# Patient Record
Sex: FEMALE | Race: BLACK OR AFRICAN AMERICAN
[De-identification: names, ages, dates, MRNs, and addresses within clinical notes are randomized per-mention and may not be internally consistent; named-entity substitution may affect disease eponyms.]

---

## 2022-05-07 ENCOUNTER — HOSPITAL ENCOUNTER (INPATIENT)
Dept: HOSPITAL 54 - ER | Age: 72
LOS: 3 days | Discharge: NURSING, REHAB OR LONG-TERM CARE | DRG: 641 | End: 2022-05-10
Attending: FAMILY MEDICINE | Admitting: INTERNAL MEDICINE
Payer: MEDICARE

## 2022-05-07 VITALS — WEIGHT: 160 LBS | BODY MASS INDEX: 24.25 KG/M2 | HEIGHT: 68 IN

## 2022-05-07 VITALS — SYSTOLIC BLOOD PRESSURE: 148 MMHG | DIASTOLIC BLOOD PRESSURE: 86 MMHG

## 2022-05-07 VITALS — SYSTOLIC BLOOD PRESSURE: 158 MMHG | DIASTOLIC BLOOD PRESSURE: 69 MMHG

## 2022-05-07 DIAGNOSIS — J44.9: ICD-10-CM

## 2022-05-07 DIAGNOSIS — R62.7: Primary | ICD-10-CM

## 2022-05-07 DIAGNOSIS — E11.65: ICD-10-CM

## 2022-05-07 DIAGNOSIS — I10: ICD-10-CM

## 2022-05-07 DIAGNOSIS — F03.90: ICD-10-CM

## 2022-05-07 DIAGNOSIS — B96.89: ICD-10-CM

## 2022-05-07 DIAGNOSIS — Z91.013: ICD-10-CM

## 2022-05-07 DIAGNOSIS — N39.0: ICD-10-CM

## 2022-05-07 DIAGNOSIS — E88.09: ICD-10-CM

## 2022-05-07 DIAGNOSIS — E78.5: ICD-10-CM

## 2022-05-07 DIAGNOSIS — K21.9: ICD-10-CM

## 2022-05-07 DIAGNOSIS — I69.351: ICD-10-CM

## 2022-05-07 DIAGNOSIS — D63.8: ICD-10-CM

## 2022-05-07 DIAGNOSIS — E44.0: ICD-10-CM

## 2022-05-07 DIAGNOSIS — Z20.822: ICD-10-CM

## 2022-05-07 LAB
ALBUMIN SERPL BCP-MCNC: 3.2 G/DL (ref 3.4–5)
ALP SERPL-CCNC: 84 U/L (ref 46–116)
ALT SERPL W P-5'-P-CCNC: 27 U/L (ref 12–78)
AST SERPL W P-5'-P-CCNC: 20 U/L (ref 15–37)
BASOPHILS # BLD AUTO: 0 K/UL (ref 0–0.2)
BASOPHILS NFR BLD AUTO: 0.4 % (ref 0–2)
BILIRUB DIRECT SERPL-MCNC: 0.1 MG/DL (ref 0–0.2)
BILIRUB SERPL-MCNC: 0.6 MG/DL (ref 0.2–1)
BILIRUB UR QL STRIP: NEGATIVE
BUN SERPL-MCNC: 17 MG/DL (ref 7–18)
CALCIUM SERPL-MCNC: 9.3 MG/DL (ref 8.5–10.1)
CHLORIDE SERPL-SCNC: 102 MMOL/L (ref 98–107)
CO2 SERPL-SCNC: 25 MMOL/L (ref 21–32)
COLOR UR: YELLOW
CREAT SERPL-MCNC: 0.8 MG/DL (ref 0.6–1.3)
DEPRECATED SQUAMOUS URNS QL MICRO: (no result) /HPF
EOSINOPHIL NFR BLD AUTO: 3 % (ref 0–6)
GLUCOSE SERPL-MCNC: 215 MG/DL (ref 74–106)
GLUCOSE UR STRIP-MCNC: NEGATIVE MG/DL
HCT VFR BLD AUTO: 36 % (ref 33–45)
HGB BLD-MCNC: 12.3 G/DL (ref 11.5–14.8)
LEUKOCYTE ESTERASE UR QL STRIP: (no result)
LIPASE SERPL-CCNC: 52 U/L (ref 73–393)
LYMPHOCYTES NFR BLD AUTO: 2.3 K/UL (ref 0.8–4.8)
LYMPHOCYTES NFR BLD AUTO: 22.3 % (ref 20–44)
MCHC RBC AUTO-ENTMCNC: 34 G/DL (ref 31–36)
MCV RBC AUTO: 86 FL (ref 82–100)
MONOCYTES NFR BLD AUTO: 1.1 K/UL (ref 0.1–1.3)
MONOCYTES NFR BLD AUTO: 10.6 % (ref 2–12)
NEUTROPHILS # BLD AUTO: 6.6 K/UL (ref 1.8–8.9)
NEUTROPHILS NFR BLD AUTO: 63.7 % (ref 43–81)
NITRITE UR QL STRIP: NEGATIVE
PH UR STRIP: 6 [PH] (ref 5–8)
PLATELET # BLD AUTO: 367 K/UL (ref 150–450)
POTASSIUM SERPL-SCNC: 3.8 MMOL/L (ref 3.5–5.1)
PROT SERPL-MCNC: 7.9 G/DL (ref 6.4–8.2)
PROT UR QL STRIP: 100 MG/DL
RBC # BLD AUTO: 4.24 MIL/UL (ref 4–5.2)
RBC #/AREA URNS HPF: (no result) /HPF (ref 0–2)
SODIUM SERPL-SCNC: 136 MMOL/L (ref 136–145)
UROBILINOGEN UR STRIP-MCNC: 0.2 EU/DL
WBC NRBC COR # BLD AUTO: 10.3 K/UL (ref 4.3–11)

## 2022-05-07 PROCEDURE — C9803 HOPD COVID-19 SPEC COLLECT: HCPCS

## 2022-05-07 PROCEDURE — G0378 HOSPITAL OBSERVATION PER HR: HCPCS

## 2022-05-07 RX ADMIN — Medication SCH EACH: at 17:52

## 2022-05-07 RX ADMIN — CLONIDINE HYDROCHLORIDE SCH MG: 0.1 TABLET ORAL at 22:36

## 2022-05-07 RX ADMIN — ATORVASTATIN CALCIUM SCH MG: 10 TABLET, FILM COATED ORAL at 22:36

## 2022-05-07 RX ADMIN — ENOXAPARIN SODIUM SCH MG: 40 INJECTION SUBCUTANEOUS at 17:54

## 2022-05-07 RX ADMIN — INSULIN GLARGINE SCH UNIT: 100 INJECTION, SOLUTION SUBCUTANEOUS at 22:00

## 2022-05-07 RX ADMIN — Medication SCH MG: at 22:36

## 2022-05-07 RX ADMIN — INSULIN GLARGINE SCH UNIT: 100 INJECTION, SOLUTION SUBCUTANEOUS at 22:21

## 2022-05-07 RX ADMIN — Medication SCH EACH: at 22:29

## 2022-05-07 RX ADMIN — HUMAN INSULIN PRN UNIT: 100 INJECTION, SOLUTION SUBCUTANEOUS at 22:14

## 2022-05-07 RX ADMIN — INSULIN HUMAN PRN UNITS: 100 INJECTION, SOLUTION PARENTERAL at 18:00

## 2022-05-07 NOTE — NUR
RECEIVED PATIENT IN BED, ALERT AND ORIENTED X2-3, ROOM AIR, EXPRESSIVE APHASIA, FOLLOWS 
COMMAND, RIGHT SIDED HEMIPARESIS, NOT IN APPARENT PAIN, CONTE CATHETER DRAINING WELL, HEELS 
OFFLOADED, WILL CONTINUE TO MONITOR.

## 2022-05-07 NOTE — NUR
END OF SHIFT REPORT



Received patient via [facility] at 1545. Patient is alert but with expressive aphasia noted, 
able to communicate needs by pointing and through action. Stable on room air, breathing 
evenly and unlabored. No SOB or s/s of distress noted. IV access on LAC #20, 1/2 NS hooked 
and running at 65ml/hr. West catheter in place draining to a yellow colored urine. Patient 
oriented to room and how to use the call light. Patient made comfortable. Photos of multiple 
wound taken and placed in chart. Lung clear. Bowel sounds present x 4. Safety precautions in 
place: bed in low, locked position; siderails up x 2; call light within reach. will endorse 
to night shift nurse for JAY.

## 2022-05-08 VITALS — DIASTOLIC BLOOD PRESSURE: 78 MMHG | SYSTOLIC BLOOD PRESSURE: 138 MMHG

## 2022-05-08 VITALS — DIASTOLIC BLOOD PRESSURE: 61 MMHG | SYSTOLIC BLOOD PRESSURE: 148 MMHG

## 2022-05-08 VITALS — DIASTOLIC BLOOD PRESSURE: 70 MMHG | SYSTOLIC BLOOD PRESSURE: 139 MMHG

## 2022-05-08 VITALS — DIASTOLIC BLOOD PRESSURE: 78 MMHG | SYSTOLIC BLOOD PRESSURE: 167 MMHG

## 2022-05-08 LAB
ALBUMIN SERPL BCP-MCNC: 2.9 G/DL (ref 3.4–5)
ALP SERPL-CCNC: 71 U/L (ref 46–116)
ALT SERPL W P-5'-P-CCNC: 24 U/L (ref 12–78)
AST SERPL W P-5'-P-CCNC: 20 U/L (ref 15–37)
BASOPHILS # BLD AUTO: 0 K/UL (ref 0–0.2)
BASOPHILS NFR BLD AUTO: 0.5 % (ref 0–2)
BILIRUB SERPL-MCNC: 0.6 MG/DL (ref 0.2–1)
BUN SERPL-MCNC: 13 MG/DL (ref 7–18)
CALCIUM SERPL-MCNC: 9 MG/DL (ref 8.5–10.1)
CHLORIDE SERPL-SCNC: 105 MMOL/L (ref 98–107)
CO2 SERPL-SCNC: 22 MMOL/L (ref 21–32)
CREAT SERPL-MCNC: 0.8 MG/DL (ref 0.6–1.3)
EOSINOPHIL NFR BLD AUTO: 3.3 % (ref 0–6)
GLUCOSE SERPL-MCNC: 142 MG/DL (ref 74–106)
HCT VFR BLD AUTO: 33 % (ref 33–45)
HGB BLD-MCNC: 11.3 G/DL (ref 11.5–14.8)
LYMPHOCYTES NFR BLD AUTO: 2.1 K/UL (ref 0.8–4.8)
LYMPHOCYTES NFR BLD AUTO: 23.5 % (ref 20–44)
MAGNESIUM SERPL-MCNC: 1.9 MG/DL (ref 1.8–2.4)
MCHC RBC AUTO-ENTMCNC: 34 G/DL (ref 31–36)
MCV RBC AUTO: 86 FL (ref 82–100)
MONOCYTES NFR BLD AUTO: 1.3 K/UL (ref 0.1–1.3)
MONOCYTES NFR BLD AUTO: 14.1 % (ref 2–12)
NEUTROPHILS # BLD AUTO: 5.3 K/UL (ref 1.8–8.9)
NEUTROPHILS NFR BLD AUTO: 58.6 % (ref 43–81)
PHOSPHATE SERPL-MCNC: 4 MG/DL (ref 2.5–4.9)
PLATELET # BLD AUTO: 367 K/UL (ref 150–450)
POTASSIUM SERPL-SCNC: 3.9 MMOL/L (ref 3.5–5.1)
PROT SERPL-MCNC: 7.3 G/DL (ref 6.4–8.2)
RBC # BLD AUTO: 3.85 MIL/UL (ref 4–5.2)
SODIUM SERPL-SCNC: 138 MMOL/L (ref 136–145)
WBC NRBC COR # BLD AUTO: 9.1 K/UL (ref 4.3–11)

## 2022-05-08 RX ADMIN — HUMAN INSULIN PRN UNIT: 100 INJECTION, SOLUTION SUBCUTANEOUS at 21:37

## 2022-05-08 RX ADMIN — INSULIN HUMAN PRN UNITS: 100 INJECTION, SOLUTION PARENTERAL at 12:54

## 2022-05-08 RX ADMIN — CLONIDINE HYDROCHLORIDE SCH MG: 0.1 TABLET ORAL at 21:11

## 2022-05-08 RX ADMIN — Medication SCH EACH: at 12:47

## 2022-05-08 RX ADMIN — Medication SCH EACH: at 06:47

## 2022-05-08 RX ADMIN — CLONIDINE HYDROCHLORIDE SCH MG: 0.1 TABLET ORAL at 12:48

## 2022-05-08 RX ADMIN — Medication SCH MG: at 09:34

## 2022-05-08 RX ADMIN — DEXTROSE MONOHYDRATE SCH MLS/HR: 50 INJECTION, SOLUTION INTRAVENOUS at 09:35

## 2022-05-08 RX ADMIN — POLYETHYLENE GLYCOL 3350 SCH GM: 17 POWDER, FOR SOLUTION ORAL at 09:34

## 2022-05-08 RX ADMIN — ATORVASTATIN CALCIUM SCH MG: 10 TABLET, FILM COATED ORAL at 21:12

## 2022-05-08 RX ADMIN — Medication SCH MG: at 21:12

## 2022-05-08 RX ADMIN — INSULIN HUMAN PRN UNITS: 100 INJECTION, SOLUTION PARENTERAL at 06:32

## 2022-05-08 RX ADMIN — Medication SCH EACH: at 21:29

## 2022-05-08 RX ADMIN — FERROUS SULFATE TAB 325 MG (65 MG ELEMENTAL FE) SCH MG: 325 (65 FE) TAB at 09:34

## 2022-05-08 RX ADMIN — INSULIN GLARGINE SCH UNIT: 100 INJECTION, SOLUTION SUBCUTANEOUS at 21:37

## 2022-05-08 RX ADMIN — CLONIDINE HYDROCHLORIDE SCH MG: 0.1 TABLET ORAL at 05:17

## 2022-05-08 RX ADMIN — INSULIN HUMAN PRN UNITS: 100 INJECTION, SOLUTION PARENTERAL at 17:50

## 2022-05-08 RX ADMIN — PANTOPRAZOLE SODIUM SCH MG: 40 TABLET, DELAYED RELEASE ORAL at 09:34

## 2022-05-08 RX ADMIN — Medication SCH EACH: at 17:41

## 2022-05-08 RX ADMIN — AMLODIPINE BESYLATE SCH MG: 10 TABLET ORAL at 09:34

## 2022-05-08 RX ADMIN — THERA TABS SCH UDTAB: TAB at 09:34

## 2022-05-08 RX ADMIN — ENOXAPARIN SODIUM SCH MG: 40 INJECTION SUBCUTANEOUS at 17:38

## 2022-05-08 RX ADMIN — INSULIN GLARGINE SCH UNIT: 100 INJECTION, SOLUTION SUBCUTANEOUS at 21:32

## 2022-05-08 NOTE — NUR
ALERT/ORIENTED X2-3, APHASIAC, ABLE TO FOLLOW COMMANDS, STABLE ON ROOM AIR, NO COMPLAIN OF 
PAIN, IRRITABLE AT TIMES, ABLE TO SWALLOW MEDICATIONS WHOLE, GIVEN ONE AT A TIME, RIGHT 
SIDED WEAKNESS, SOLVING CROSSWORD, CONTE CATHETER DRAINING WELL, CONTINUE IVF, BLOOD SUGAR 
CONTROL, BP CONTROL, ON CLONIDINE AND HYDRALAZINE, URINE CULTURE PENDING. FALL PRECAUTION,

## 2022-05-08 NOTE — NUR
MS RN CLOSING NOTE



Patient in bed, awake. Patient is alert but with expressive aphasia, able to make needs 
known by pointing and actions. Stable on room air, breathing evenly and unlabored. No SOB or 
s/s of distress noted. IV access on LAC #20 SL, intact and patent. West catheter in place 
draining to a yellow colored urine with an output of 750 cc. Due meds given. All needs 
attended to. Safety precautions maintained: bed in low, locked position; siderails up x 2; 
call light within reach. Will endorse to night shift nurse for JAY.

## 2022-05-08 NOTE — NUR
MS RN OPENING NOTE



Patient in bed, asleep. Patient is alert but with expressive aphasia. On room air, breathing 
evenly and unlabored. No SOB or s/s of distress noted. IV access on LAC #20 running 1/2 NS 
at 65 ml/hr. West catheter in place draining to a yellow colored urine. Safety precautions 
in place: bed in low, locked position; siderails up x 2; call light within reach. Will 
continue to monitor.

## 2022-05-09 VITALS — SYSTOLIC BLOOD PRESSURE: 133 MMHG | DIASTOLIC BLOOD PRESSURE: 62 MMHG

## 2022-05-09 VITALS — SYSTOLIC BLOOD PRESSURE: 139 MMHG | DIASTOLIC BLOOD PRESSURE: 62 MMHG

## 2022-05-09 VITALS — DIASTOLIC BLOOD PRESSURE: 59 MMHG | SYSTOLIC BLOOD PRESSURE: 144 MMHG

## 2022-05-09 RX ADMIN — INSULIN HUMAN PRN UNITS: 100 INJECTION, SOLUTION PARENTERAL at 12:27

## 2022-05-09 RX ADMIN — FERROUS SULFATE TAB 325 MG (65 MG ELEMENTAL FE) SCH MG: 325 (65 FE) TAB at 08:25

## 2022-05-09 RX ADMIN — Medication SCH EACH: at 18:08

## 2022-05-09 RX ADMIN — Medication SCH MG: at 21:16

## 2022-05-09 RX ADMIN — INSULIN HUMAN PRN UNITS: 100 INJECTION, SOLUTION PARENTERAL at 06:56

## 2022-05-09 RX ADMIN — Medication SCH EACH: at 11:51

## 2022-05-09 RX ADMIN — Medication SCH EACH: at 22:16

## 2022-05-09 RX ADMIN — CLONIDINE HYDROCHLORIDE SCH MG: 0.1 TABLET ORAL at 21:18

## 2022-05-09 RX ADMIN — ENOXAPARIN SODIUM SCH MG: 40 INJECTION SUBCUTANEOUS at 18:07

## 2022-05-09 RX ADMIN — AMLODIPINE BESYLATE SCH MG: 10 TABLET ORAL at 08:26

## 2022-05-09 RX ADMIN — CLONIDINE HYDROCHLORIDE SCH MG: 0.1 TABLET ORAL at 05:47

## 2022-05-09 RX ADMIN — PANTOPRAZOLE SODIUM SCH MG: 40 TABLET, DELAYED RELEASE ORAL at 08:25

## 2022-05-09 RX ADMIN — Medication SCH MG: at 08:25

## 2022-05-09 RX ADMIN — INSULIN HUMAN PRN UNITS: 100 INJECTION, SOLUTION PARENTERAL at 18:10

## 2022-05-09 RX ADMIN — CLONIDINE HYDROCHLORIDE SCH MG: 0.1 TABLET ORAL at 12:25

## 2022-05-09 RX ADMIN — DEXTROSE MONOHYDRATE SCH MLS/HR: 50 INJECTION, SOLUTION INTRAVENOUS at 08:25

## 2022-05-09 RX ADMIN — INSULIN GLARGINE SCH UNIT: 100 INJECTION, SOLUTION SUBCUTANEOUS at 22:10

## 2022-05-09 RX ADMIN — INSULIN HUMAN PRN UNITS: 100 INJECTION, SOLUTION PARENTERAL at 22:08

## 2022-05-09 RX ADMIN — ATORVASTATIN CALCIUM SCH MG: 10 TABLET, FILM COATED ORAL at 21:16

## 2022-05-09 RX ADMIN — Medication SCH EACH: at 06:50

## 2022-05-09 RX ADMIN — POLYETHYLENE GLYCOL 3350 SCH GM: 17 POWDER, FOR SOLUTION ORAL at 08:25

## 2022-05-09 RX ADMIN — INSULIN GLARGINE SCH UNIT: 100 INJECTION, SOLUTION SUBCUTANEOUS at 22:00

## 2022-05-09 RX ADMIN — THERA TABS SCH UDTAB: TAB at 08:25

## 2022-05-09 NOTE — NUR
MS RN OPENING NOTE



Patient in received in bed awake aox2. On room air, breathing evenly and unlabored. No sign 
sob/distress noted. IV access on LAC #20 running 1/2 NS at 65 ml/hr. West catheter in place 
draining to a yellow colored urine no odor noted.Safety precautions in place: bed in low, 
locked position; siderails up x 2; call light within reach. Will continue to monitor.

## 2022-05-09 NOTE — NUR
MS RN CLOSING NOTE 



PATIENT AWAKE IN BED, PATIENT ALERT/ORIENTED X 2 WITH EXPRESSIVE APHASIA. PATIENT STABLE ON 
RA, NO S/S OF DISTRESS OR SOB NOTED, BREATHING EVEN AND UNLABORED. IV ACCESS ON LEFT AC #20G 
INTACT AND SALINE LOCKED. CONTE CATH IN PLACE AND DRAINING YELLOW URINE. NO SIGNIFICANT 
CHANGES THROUGHOUT SHIFT. MEDICATIONS GIVEN AS ORDERED, PT NEEDS MET THROUGHOUT SHIFT. 
SAFETY MEASURES IN PLACE: CALL LIGHT WITHIN REACH, SIDE RAILS UP X 2, BED LOCKED IN LOWEST 
POSITION, HOB ELEVATED, BED ALARM ON. WILL ENDORSE TO DAY SHIFT NURSE FOR CONTINUITY OF CARE

## 2022-05-10 VITALS — DIASTOLIC BLOOD PRESSURE: 60 MMHG | SYSTOLIC BLOOD PRESSURE: 119 MMHG

## 2022-05-10 VITALS — DIASTOLIC BLOOD PRESSURE: 62 MMHG | SYSTOLIC BLOOD PRESSURE: 133 MMHG

## 2022-05-10 VITALS — SYSTOLIC BLOOD PRESSURE: 164 MMHG | DIASTOLIC BLOOD PRESSURE: 72 MMHG

## 2022-05-10 LAB
BASOPHILS # BLD AUTO: 0 K/UL (ref 0–0.2)
BASOPHILS NFR BLD AUTO: 0.2 % (ref 0–2)
BUN SERPL-MCNC: 16 MG/DL (ref 7–18)
CALCIUM SERPL-MCNC: 8.8 MG/DL (ref 8.5–10.1)
CHLORIDE SERPL-SCNC: 105 MMOL/L (ref 98–107)
CO2 SERPL-SCNC: 25 MMOL/L (ref 21–32)
CREAT SERPL-MCNC: 0.8 MG/DL (ref 0.6–1.3)
EOSINOPHIL NFR BLD AUTO: 2.6 % (ref 0–6)
GLUCOSE SERPL-MCNC: 168 MG/DL (ref 74–106)
HCT VFR BLD AUTO: 32 % (ref 33–45)
HGB BLD-MCNC: 11 G/DL (ref 11.5–14.8)
LYMPHOCYTES NFR BLD AUTO: 1.7 K/UL (ref 0.8–4.8)
LYMPHOCYTES NFR BLD AUTO: 17.1 % (ref 20–44)
MAGNESIUM SERPL-MCNC: 1.8 MG/DL (ref 1.8–2.4)
MCHC RBC AUTO-ENTMCNC: 34 G/DL (ref 31–36)
MCV RBC AUTO: 86 FL (ref 82–100)
MONOCYTES NFR BLD AUTO: 1.4 K/UL (ref 0.1–1.3)
MONOCYTES NFR BLD AUTO: 14.3 % (ref 2–12)
NEUTROPHILS # BLD AUTO: 6.4 K/UL (ref 1.8–8.9)
NEUTROPHILS NFR BLD AUTO: 65.8 % (ref 43–81)
PHOSPHATE SERPL-MCNC: 3.7 MG/DL (ref 2.5–4.9)
PLATELET # BLD AUTO: 368 K/UL (ref 150–450)
POTASSIUM SERPL-SCNC: 3.6 MMOL/L (ref 3.5–5.1)
RBC # BLD AUTO: 3.75 MIL/UL (ref 4–5.2)
SODIUM SERPL-SCNC: 139 MMOL/L (ref 136–145)
WBC NRBC COR # BLD AUTO: 9.8 K/UL (ref 4.3–11)

## 2022-05-10 RX ADMIN — Medication SCH EACH: at 06:43

## 2022-05-10 RX ADMIN — POLYETHYLENE GLYCOL 3350 SCH GM: 17 POWDER, FOR SOLUTION ORAL at 08:28

## 2022-05-10 RX ADMIN — Medication SCH MG: at 08:28

## 2022-05-10 RX ADMIN — AMLODIPINE BESYLATE SCH MG: 10 TABLET ORAL at 08:28

## 2022-05-10 RX ADMIN — FERROUS SULFATE TAB 325 MG (65 MG ELEMENTAL FE) SCH MG: 325 (65 FE) TAB at 08:28

## 2022-05-10 RX ADMIN — PANTOPRAZOLE SODIUM SCH MG: 40 TABLET, DELAYED RELEASE ORAL at 08:28

## 2022-05-10 RX ADMIN — Medication SCH EACH: at 11:30

## 2022-05-10 RX ADMIN — INSULIN HUMAN PRN UNITS: 100 INJECTION, SOLUTION PARENTERAL at 11:29

## 2022-05-10 RX ADMIN — CLONIDINE HYDROCHLORIDE SCH MG: 0.1 TABLET ORAL at 12:16

## 2022-05-10 RX ADMIN — THERA TABS SCH UDTAB: TAB at 08:28

## 2022-05-10 RX ADMIN — DEXTROSE MONOHYDRATE SCH MLS/HR: 50 INJECTION, SOLUTION INTRAVENOUS at 08:28

## 2022-05-10 RX ADMIN — INSULIN HUMAN PRN UNITS: 100 INJECTION, SOLUTION PARENTERAL at 06:39

## 2022-05-10 RX ADMIN — CLONIDINE HYDROCHLORIDE SCH MG: 0.1 TABLET ORAL at 04:12

## 2022-05-10 NOTE — NUR
Patient to be discharged to [facility] and rehab today, spoke to Beth HANNA to give 
report (phone: [phone]),  no apparent distress noted, denies any pain or discomfort, 
able to make needs known, can follow simple commands, no chest pain, no dizziness, no 
palpitations, no change in level of consciousness, no tremors, no s/s of hypo/hyperglycemia. 
Abdominal bowel sound present in all quadrants, no grimacing when abdomen palpated, no 
abdominal distention noted. Patient made aware of the situation, and she signed all 
discharge paperwork and inventory list, all belongings taken by patient. Health teaching 
provided, verbalized understanding and gratitude. Skin intact, warm to touch, no pallor or 
cyanosis noted. Patient was noted to have dry eschar/callus to right posterior heel, 
macerated skin with rash and scarring to sacrum, buttocks and inner thigh, right knee dry 
scab, left foot (ball of foot) dryness, but preferred not to have a photo taken during 
discharge, explained the risks and benefits and purpose of taking pictures still strongly 
refused thrice, respected patient's wishes. Oliver catheter removed prior to discharge as per 
MD order, olivre catheter complete, no bleeding noted, no unusual odor, no unusual discharge, 
no bladder distention, no grimacing when bladder palpated and patient was able to void, no 
hematuria. Peripheral IV on left antecubital was removed by prior to discharge, complete, 
site covered with dry dressing, no excessive bleeding noted. Name wristband removed prior to 
discharge. [facility] EMTs came to  patient. Patient left 
unit at 1415pm, stable condition, surgical facial mask and exit care folder with paperworks 
given to [facility] EMTs.

## 2022-05-10 NOTE — NUR
RN OPENING NOTES

Patient seen comfortably lying in bed, no apparent distress noted, respirations even and 
unlabored, no shortness of breath, no grimacing, denies any pain or discomfort at this time. 
Call light left within reach, safety precautions in place, brakes locked, side rails up X 2.

## 2022-05-10 NOTE — NUR
WOUND CARE CONSULT: PT PRESENTS WITH SKIN ISSUES INCLUDING DRY ESCHAR/CALLUS TO RT POSTERIOR 
HEEL, MACERATED SKIN WITH RASH AND SCARRING TO SACRUM, BUTTOCKS AND INNER THIGHS, PRESENT ON 
ADMISSION. PT NOTED TO HAVE SOME BLEEDING AT PERINEUM. RECOMMENDATIONS MADE FOR SKIN 
PROTECTION. DISCUSSED WITH NURSING STAFF. MD IN AGREEMENT WITH PLAN OF CARE.

## 2022-05-10 NOTE — NUR
RN CLOSSING NOTES.



PATIENT IN BED AWAKE AOX2 PERIOD OF CONFUSION.ON RA JARED WELL SATING 97%.NO SIGN SOB/DISTRESS 
NOTED.NO COMPLAINED OF PAIN/DISCOMFORT DURING SHIFT.IV ACCESS ON LAC #20G.ALL NEEDS 
ATTENDED.CALL LIGHT WITHIN REACH.SAFETY MEASURED INPLACE LOW BED AND LOCKED  POSITION.WILL 
ENDORSED TO NEXT SHIFT.